# Patient Record
Sex: FEMALE | Race: WHITE | Employment: STUDENT | URBAN - METROPOLITAN AREA
[De-identification: names, ages, dates, MRNs, and addresses within clinical notes are randomized per-mention and may not be internally consistent; named-entity substitution may affect disease eponyms.]

---

## 2018-09-08 PROBLEM — O00.209 ECTOPIC PREGNANCY OF OVARY: Status: ACTIVE | Noted: 2018-09-08

## 2020-07-20 ENCOUNTER — HOSPITAL ENCOUNTER (OUTPATIENT)
Dept: PHYSICAL THERAPY | Age: 35
Discharge: HOME | End: 2020-07-20
Payer: MEDICAID

## 2020-07-20 PROCEDURE — 97163 PT EVAL HIGH COMPLEX 45 MIN: CPT

## 2020-07-20 NOTE — PROGRESS NOTES
PHYSICAL THERAPY - DAILY TREATMENT NOTE    Patient Name: Elissa Lyons        Date: 2020  : 1985   YES Patient  Verified  Visit #:   1   of   8  Insurance: Payor: Magda Patel / Plan: [address] [place] [address] / Product Type: Managed Care Medicaid /      In time:  Out time: 130   Total Treatment Time: 45     Medicare Time Tracking (below)   Total Timed Codes (min):    1:1 Treatment Time:       TREATMENT AREA =  Temporomandibular joint disorder (TMJ) [M26.609]    SUBJECTIVE                                                         See IE            OBJECTIVE      5 min Self Care: See below   Rationale:    increase ROM and increase strength to improve the patients ability to perform ADLs with less pain    Billed With/As:   [] TE   [] TA   [] Neuro   [x] Self Care Patient Education: [x] Review HEP    [] Progressed/Changed HEP based on:   [] positioning   [] body mechanics   [] transfers   [] heat/ice application    [] other:         thoroughout evaluation min Patient Education:  YES  Reviewed HEP   [x] A and P related to current DX [x] LTGs and POC     Other Objective/Functional Measures:                                   See IE       ASSESSMENT    [x]  See Initial Evaluation     PLAN    []  Upgrade activities as tolerated YES Continue plan of care   []  Discharge due to :    []  Other: Pt will return for follow up and to initiate POC     Therapist: Pattie Lomeli PT, [address]    Date: 2020 Time: 9:19 AM

## 2020-07-20 NOTE — PROGRESS NOTES
[facility] PHYSICAL THERAPY AT [facility] Nathaly Hester, [address] - Phone: [phone]  Fax: [phone] / [address] [facility]  Patient Name: Joyce Tobias : 1985   Medical   Diagnosis: Temporomandibular joint disorder (TMJ) [M26.609] Treatment Diagnosis: TMJ disorder, Neck pain   Onset Date:      Referral Source: Magen Nair, * Start of Care [facility]): 2020   Prior Hospitalization: See medical history Provider #: [id number]   Prior Level of Function:    Comorbidities: Neck pain, HA   Medications: Verified on Patient Summary List     The Plan of Care and following information is based on the information from the initial evaluation.   ===========================================================================================  Assessment / key information:   Joyce Tobias is a 29 y.o.  yo female with Dx of Temporomandibular joint disorder (TMJ) [M26.609]. She currently rates her pain as 7/10 at worst, 2/10 at best, primarily located at B cervical and TMJ region. She complains of difficulty and increased pain with sitting, studying, and sleeping. She notes TMJ pain is increased with stress. Notes daily HA in suboccipital region and intermittent N and T into B 1st-5th digits. Objective Findings:  Cervical ROM:  Ext with R deviation, Lat Flx; R = 20, L = 25, Rot: R = 50, L = 60. Limited subcranial motion with B OA, AA motion. Manual Muscle Testing: 3+/5 for MT, LT B. Posture: IR pattern B shoulder. Vertical opening 30 mm excursion R at 10 mm, L at 8 mm, with opening pattern to right. Hypermobile left. TTP in B masseters, SCOM, suboccipitals and sub and posterior mandibular region. Muscle length restrictions noted in B Pec minor, R > L. Directional preference extension with repeated motion testing. Compression/Distraction Test: + Spurlings Test: + .  Pt instructed in HEP and will f/u in clinic for PT, indicating signs and symptoms consistent with TMJ and cervical dysfunction.     ===========================================================================================  Eval Complexity: History MEDIUM  Complexity : 1-2 comorbidities / personal factors will impact the outcome/ POC ;  Examination  MEDIUM Complexity : 3 Standardized tests and measures addressing body structure, function, activity limitation and / or participation in recreation ; Presentation LOW Complexity : Stable, uncomplicated ;  Decision Making MEDIUM Complexity : FOTO score of 26-74; Overall Complexity LOW   Problem List: decrease ROM, decrease strength, decrease ADL/ functional abilitiies, decrease activity tolerance and decrease flexibility/ joint mobility   Treatment Plan may include any combination of the following: Therapeutic exercise, Therapeutic activities, Neuromuscular re-education, Manual therapy, Patient education, Self Care training and Functional mobility training  Patient / Family readiness to learn indicated by: asking questions, trying to perform skills and interest  Persons(s) to be included in education: patient (P)  Barriers to Learning/Limitations: None  Measures taken, if barriers to learning:    Patient Goal (s): To have less pain, N and T     Rehabilitation Potential: good   Short Term Goals: To be accomplished in 2 weeks:  1. Establish HEP for strength, flexibility and joint distraction. 2. Pt will demonstrate full AROM of TMJ region to improve pain level for ADLs. 3. Patient to report reduction of daily symptoms < 5/10 to improve function for ADLs, IADLs.  Long Term Goals: To be accomplished in  4 weeks:  1. The patient will be independent in HEP for long-term management of symptoms. 2. Improve functional ability as evidenced by a score of > or = 59 on FOTO. 3. Patient will demonstrate functional and nonpainful cervical rotation and sidebending to improve ability to perform ADLs, IADLs.   4. Pt will report centralization of symptoms x 2 weeks to improve ability to perform ADLs, IADLs with less pain. Frequency / Duration:   Patient to be seen 2 times per week for 4  weeks:  Patient / Caregiver education and instruction: self care and exercises    Therapist Signature: Kaelyn Bolanos PT Date: 0/05/9407   Certification Period: n/a Time: 9:19 AM   ===========================================================================================  I certify that the above Physical Therapy Services are being furnished while the patient is under my care. I agree with the treatment plan and certify that this therapy is necessary. To ensure your patient receives the highest quality care and to avoid disruption in therapy please sign and return this plan of care within 21 days. Per Medicaid guidelines if the plan of care is not received within 21 days the patient's care must be put on hold until signed. Physician Signature:        Date:       Time:     Please sign and return to [facility] or you may fax the signed copy to [phone]. Thank you.

## 2020-07-22 ENCOUNTER — HOSPITAL ENCOUNTER (OUTPATIENT)
Dept: PHYSICAL THERAPY | Age: 35
Discharge: HOME | End: 2020-07-22
Payer: MEDICAID

## 2020-07-22 PROCEDURE — 97535 SELF CARE MNGMENT TRAINING: CPT

## 2020-07-22 PROCEDURE — 97112 NEUROMUSCULAR REEDUCATION: CPT

## 2020-07-22 PROCEDURE — 97110 THERAPEUTIC EXERCISES: CPT

## 2020-07-22 NOTE — PROGRESS NOTES
PHYSICAL THERAPY - DAILY TREATMENT NOTE    Patient Name: Rema Calvo        Date: 2020  : 1985    Patient  Verified: YES  Visit #:   2   of   12  Insurance: Payor: BLUE CROSS MEDICAID / Plan: [facility] Markus Castor / Product Type: Managed Care Medicaid /      In time: 11:45 Out time: 12:40   Total Treatment Time: 55     Medicare Time Tracking (below)   Total Timed Codes (min):  - 1:1 Treatment Time:  -     TREATMENT AREA/ DIAGNOSIS = Temporomandibular joint disorder (TMJ) [M26.609]    SUBJECTIVE  Pain Level (on 0 to 10 scale):  4  / 10   Medication Changes/New allergies or changes in medical history, any new surgeries or procedures? NO    If yes, update Summary List   Subjective Functional Status/Changes:  []  No changes reported     Pt reports numbness and tingling in the hands whn reaching overhead.        OBJECTIVE  Modalities Rationale:     decrease inflammation and decrease pain to improve patient's ability to perform ADLs without pain     min [] Estim, type/location:                                      []  att     []  unatt     []  w/US     []  w/ice    []  w/heat    min []  Mechanical Traction: type/lbs                   []  pro   []  sup   []  int   []  cont    []  before manual    []  after manual    min []  Ultrasound, settings/location:      min []  Iontophoresis w/ dexamethasone, location:                                               []  take home patch       []  in clinic   10 min []  Ice     [x]  Heat    location/position: c/s    min []  Vasopneumatic Device, press/temp:     min []  Other:    [] Skin assessment post-treatment (if applicable):    []  intact    []  redness- no adverse reaction     []redness  adverse reaction:        20 min Therapeutic Exercise:  [x]  See flow sheet   Rationale:      increase strength and improve coordination to improve the patients ability to perform ADLs without pain       5 min Manual Therapy: Trigger point release to scalenes Rationale:      increase ROM and increase tissue extensibility to improve patient's ability to perform ADLs without pain    10 min Neuromuscular Re-ed: [x]  See flow sheet   Rationale:    increase proprioception and , increase postural awareness to improve the patients ability to perform ADLs without pain       min Gait Training:  ___ feet with ___ device on level surfaces with ___ level of assistance   Rationale: To improve ambulation safety and efficiency in order to improve patient's ability to safely ambulate at home for self care. 15 min Self Care: Education on pathology   Rationale:    increase strength and improve coordination to improve the patients ability to perform ADLs without pain      Billed With/As:   [x] TE   [] TA   [] Neuro   [] Self Care Patient Education: [x] Review HEP    [] Progressed/Changed HEP based on:   [] positioning   [] body mechanics   [] transfers   [] heat/ice application    [] other:        Other Objective/Functional Measures:    + spurling  + cyriax release test  + Median nerve tension test  TTP to scalenes, upper trap  Decreased mobility of first rib     Post Treatment Pain Level (on 0 to 10) scale:   4  / 10     ASSESSMENT  Assessment/Changes in Function:     Pt required significant cueing for proper mechanics with rows and extension to avoid increased upper trap and scalene activation     []  See Progress Note/Recertification   Patient will continue to benefit from skilled PT services to modify and progress therapeutic interventions, address functional mobility deficits, address ROM deficits and address strength deficits to attain remaining goals. Progress toward goals / Updated goals:    No progress toward goals.  Just initiated plan of care     PLAN  [x]  Upgrade activities as tolerated YES Continue plan of care   []  Discharge due to :    []  Other:      Therapist: Haven Arevalo DPT     Date: 7/22/2020 Time: 10:13 AM        Future Appointments   Date Time Provider Adonay Babin   7/22/2020 11:45 AM [facility]   7/30/2020  8:45 AM [facility]   8/3/2020 11:30 AM [facility]   8/6/2020  7:30 AM [facility]

## 2020-07-30 ENCOUNTER — HOSPITAL ENCOUNTER (OUTPATIENT)
Dept: PHYSICAL THERAPY | Age: 35
Discharge: HOME | End: 2020-07-30
Payer: MEDICAID

## 2020-07-30 PROCEDURE — 97110 THERAPEUTIC EXERCISES: CPT

## 2020-07-30 PROCEDURE — 97535 SELF CARE MNGMENT TRAINING: CPT

## 2020-07-30 PROCEDURE — 97112 NEUROMUSCULAR REEDUCATION: CPT

## 2020-08-03 ENCOUNTER — HOSPITAL ENCOUNTER (OUTPATIENT)
Dept: PHYSICAL THERAPY | Age: 35
Discharge: HOME | End: 2020-08-03
Payer: MEDICAID

## 2020-08-03 PROCEDURE — 97535 SELF CARE MNGMENT TRAINING: CPT

## 2020-08-03 PROCEDURE — 97530 THERAPEUTIC ACTIVITIES: CPT

## 2020-08-03 PROCEDURE — 97112 NEUROMUSCULAR REEDUCATION: CPT

## 2020-08-03 PROCEDURE — 97110 THERAPEUTIC EXERCISES: CPT

## 2020-08-03 NOTE — PROGRESS NOTES
PHYSICAL THERAPY - DAILY TREATMENT NOTE     Patient Name: Linda Caballero        Date: 8/3/2020  : 1985    Patient  Verified: YES  Visit #:      12  Insurance: Payor: KIKA Torres / [place] [employer] Jorge Mooney / Product Type: Managed Care Medicaid /      In time: 11:30 Out time: 12:40   Total Treatment Time: 70     Medicare Time Tracking (below)   Total Timed Codes (min):  - 1:1 Treatment Time:  -     TREATMENT AREA/ DIAGNOSIS = Temporomandibular joint disorder (TMJ) [M26.609]    SUBJECTIVE  Pain Level (on 0 to 10 scale):  3  / 10   Medication Changes/New allergies or changes in medical history, any new surgeries or procedures?     NO    If yes, update Summary List   Subjective Functional Status/Changes:  []  No changes reported     Pt reports still getting numbness and tingling with activities       OBJECTIVE  Modalities Rationale:     decrease pain to improve patient's ability to perform ADLs without pain     min [] Estim, type/location:                                      []  att     []  unatt     []  w/US     []  w/ice    []  w/heat    min []  Mechanical Traction: type/lbs                   []  pro   []  sup   []  int   []  cont    []  before manual    []  after manual    min []  Ultrasound, settings/location:      min []  Iontophoresis w/ dexamethasone, location:                                               []  take home patch       []  in clinic   10 min []  Ice     [x]  Heat    location/position: c/s    min []  Vasopneumatic Device, press/temp:     min []  Other:    [] Skin assessment post-treatment (if applicable):    []  intact    []  redness- no adverse reaction     []redness  adverse reaction:        20 min Therapeutic Exercise:  [x]  See flow sheet   Rationale:      increase strength and improve coordination to improve the patients ability to perform ADLs without pain       5 min Manual Therapy: Trigger point release to scalenes   Rationale:      increase ROM and increase tissue extensibility to improve patient's ability to perform ADLs without pain      10 min Therapeutic Activity: [x]  See flow sheet   Rationale:    functional activitie to improve the patients ability to perform ADLs without pain      15 min Neuromuscular Re-ed: [x]  See flow sheet   Rationale:    increase proprioception to improve the patients ability to perform ADLs without pain    10 min Self Care: Education of pathology and exercise   Rationale:    education to improve the patients ability to self manage symptoms     Billed With/As:   [x] TE   [] TA   [] Neuro   [] Self Care Patient Education: [x] Review HEP    [] Progressed/Changed HEP based on:   [] positioning   [] body mechanics   [] transfers   [] heat/ice application    [] other:        Other Objective/Functional Measures:    Impaired scapulohumeral rhythm. Post Treatment Pain Level (on 0 to 10) scale:   0  / 10     ASSESSMENT  Assessment/Changes in Function:     Pt requires cueing for proper mechanics with therapeutic exercise     []  See Progress Note/Recertification   Patient will continue to benefit from skilled PT services to modify and progress therapeutic interventions, address functional mobility deficits, address ROM deficits and address strength deficits to attain remaining goals.    Progress toward goals / Updated goals:    Good Progress to    [] STG    [x] LTG  1 as shown by improved strength      PLAN  [x]  Upgrade activities as tolerated YES Continue plan of care   []  Discharge due to :    []  Other:      Therapist: Elan Mcguire DPT     Date: 8/3/2020 Time: 10:36 AM        Future Appointments   Date Time Provider Adonay Babin   8/3/2020 11:30 AM Danita Olds [facility]   8/6/2020  7:30 AM Danita Olds [facility]

## 2020-08-06 ENCOUNTER — HOSPITAL ENCOUNTER (OUTPATIENT)
Dept: PHYSICAL THERAPY | Age: 35
Discharge: HOME | End: 2020-08-06
Payer: MEDICAID

## 2020-08-06 PROCEDURE — 97535 SELF CARE MNGMENT TRAINING: CPT

## 2020-08-06 PROCEDURE — 97110 THERAPEUTIC EXERCISES: CPT

## 2020-08-06 PROCEDURE — 97112 NEUROMUSCULAR REEDUCATION: CPT

## 2020-08-06 NOTE — PROGRESS NOTES
PHYSICAL THERAPY - DAILY TREATMENT NOTE    Patient Name: Colt Kelsey        Date: 2020  : 1985    Patient  Verified: YES  Visit #:      of   12  Insurance: Payor: BLUE CROSS MEDICAID / Plan: [facility] / Product Type: Managed Care Medicaid /      In time: 7:30 Out time: 8:30   Total Treatment Time: 60     Medicare Time Tracking (below)   Total Timed Codes (min):  - 1:1 Treatment Time:  -     TREATMENT AREA/ DIAGNOSIS = Temporomandibular joint disorder (TMJ) [M26.609]    SUBJECTIVE  Pain Level (on 0 to 10 scale):  4  / 10   Medication Changes/New allergies or changes in medical history, any new surgeries or procedures?     NO    If yes, update Summary List   Subjective Functional Status/Changes:  []  No changes reported     Pt reports that she is still getting numbness and tingling with ADLs, Pt reports getting cramping on L levator and R medial scapular border with activities      OBJECTIVE  Modalities Rationale:     decrease pain and increase tissue extensibility to improve patient's ability to perform ADLs without pain     min [] Estim, type/location:                                      []  att     []  unatt     []  w/US     []  w/ice    []  w/heat    min []  Mechanical Traction: type/lbs                   []  pro   []  sup   []  int   []  cont    []  before manual    []  after manual    min []  Ultrasound, settings/location:      min []  Iontophoresis w/ dexamethasone, location:                                               []  take home patch       []  in clinic   10 min []  Ice     [x]  Heat    location/position: c/s    min []  Vasopneumatic Device, press/temp:     min []  Other:    [] Skin assessment post-treatment (if applicable):    []  intact    []  redness- no adverse reaction     []redness  adverse reaction:        20 min Therapeutic Exercise:  [x]  See flow sheet   Rationale:      increase strength and improve coordination to improve the patients ability to perform ADLs without pain       5 min Manual Therapy: Trigger point to L levator    Rationale:      increase tissue extensibility to improve patient's ability to perform ADLs without pain    15 min Neuromuscular Re-ed: [x]  See flow sheet   Rationale:    improve coordination, improve balance and increase proprioception to improve the patients ability to perform ADLs without pain      10 min Self Care: Education on exercise and pathology. HEP education   Rationale:    education to improve the patients ability to perform ADLs without pain      Billed With/As:   [x] TE   [] TA   [] Neuro   [] Self Care Patient Education: [x] Review HEP    [] Progressed/Changed HEP based on:   [] positioning   [] body mechanics   [] transfers   [] heat/ice application    [] other:        Other Objective/Functional Measures:    Significant trigger points in levator. Frequent spasm and cramping of lower trap with exercise   Post Treatment Pain Level (on 0 to 10) scale:   0  / 10     ASSESSMENT  Assessment/Changes in Function:     Pt still has significant compensatory motions with rows and other scapular exercises     []  See Progress Note/Recertification   Patient will continue to benefit from skilled PT services to modify and progress therapeutic interventions, address functional mobility deficits, address ROM deficits, address strength deficits, analyze and address soft tissue restrictions and analyze and cue movement patterns to attain remaining goals. Progress toward goals / Updated goals:    Fair Progress to    [] STG    [] LTG  1 as shown by still having numbness and tingling with ADLs     PLAN  []  Upgrade activities as tolerated YES Continue plan of care   []  Discharge due to :    []  Other:      Therapist: Heron Samayoa DPT     Date: 8/6/2020 Time: 7:57 AM        No future appointments.

## 2020-08-10 ENCOUNTER — HOSPITAL ENCOUNTER (OUTPATIENT)
Dept: PHYSICAL THERAPY | Age: 35
Discharge: HOME | End: 2020-08-10
Payer: MEDICAID

## 2020-08-10 PROCEDURE — 97530 THERAPEUTIC ACTIVITIES: CPT

## 2020-08-10 PROCEDURE — 97110 THERAPEUTIC EXERCISES: CPT

## 2020-08-10 PROCEDURE — 97112 NEUROMUSCULAR REEDUCATION: CPT

## 2020-08-10 PROCEDURE — 97535 SELF CARE MNGMENT TRAINING: CPT

## 2020-08-10 NOTE — PROGRESS NOTES
----- Message from Mansi Esparza sent at 6/20/2019  8:27 AM CDT -----  Contact: pt  .Type:  Patient Returning Call    Who Called: pt  Who Left Message for Patient:   Does the patient know what this is regarding?: reschedule appt   Would the patient rather a call back or a response via [facility]?  Call back   Best Call Back Number: [phone] (home)   Additional Information:       PHYSICAL THERAPY - DAILY TREATMENT NOTE     Patient Name: Nidhi Rene        Date: 8/10/2020  : 1985   YES Patient  Verified  Visit #:   6  of   10  Insurance: Payor: BLUE CROSS MEDICAID / Plan: [facility] [employer] / Product Type: Managed Care Medicaid /      In time: 3 Out time: 355   Total Treatment Time: 55     Medicare/BCBS Time Tracking (below)   Total Timed Codes (min): 5  1:1 Treatment Time:       TREATMENT AREA =  Temporomandibular joint disorder (TMJ) [M26.609]    SUBJECTIVE    Pain Level (on 0 to 10 scale):  4  / 10   Medication Changes/New allergies or changes in medical history, any new surgeries or procedures? NO    If yes, update Summary List   Subjective Functional Status/Changes:  []  No changes reported       Functional improvements: Modified her desk and home studying postures to reduce N and T and it is helping. Less pain in TMJ region soreness only. Functional impairments: More N and T in pm hours, increased with cervical flexEROM, postural FHP. OBJECTIVE    10 min Self Care: Pt ed on KT for activation of LT, MT   Rationale:    increase proprioception to improve the patients ability to perform UE elevation with less symptoms. 15 min Therapeutic Exercise:  [x]  See flow sheet   Rationale:      increase ROM and increase strength to improve the patients ability to perform ADLs with less pain     15 min Therapeutic Activity: [x]  See flow sheet   Rationale:      improve body mechanics to improve the patients ability to study with less pain. 10 min Neuromuscular Re-ed: [x]  See flow sheet   Rationale:      improve coordination, improve balance and increase proprioception to improve the patients ability to perform improved posture with studying, sitting.      5 min Manual Therapy: Technique:      [x] S/DTM []IASTM []PROM [] Passive Stretching   []manual TPR    []Jt manipulation:Gr I [] II []  III [] IV[] V[]  Treatment Area:  Gentle suboccipital releas Rationale:      decrease pain and increase tissue extensibility to improve patient's ability to perform studying with less pain. Billed With/As:   [x] TE   [] TA   [] Neuro   [] Self Care Patient Education: [x] Review HEP    [] Progressed/Changed HEP based on:   [] positioning   [] body mechanics   [] transfers   [] heat/ice application    [] other:        Other Objective/Functional Measures:    Emphasis on lower trapezius recruitment and KT for scapular positioning. Post Treatment Pain Level (on 0 to 10) scale:   2  / 10     ASSESSMENT    Assessment/Changes in Function:     Pt with modifications to rows, ext with TB with KT. Pt demonstrated good understanding of 2 strip application \"I\" strips B and issued for HEP. Attempted inhibition of UT, levator scap L however, pt with increased symptoms with this technique and removed tape. []  See Progress Note/Recertification   Patient will continue to benefit from skilled PT services to modify and progress therapeutic interventions, address functional mobility deficits, address ROM deficits, address strength deficits, analyze and address soft tissue restrictions and analyze and cue movement patterns to attain remaining goals. Progress toward goals / Updated goals:    Pt with good response to KT with reduced UT compensation during UE elevation and improved ability to recruit LT.      PLAN    [x]  Upgrade activities as tolerated YES Continue plan of care   []  Discharge due to :    []  Other:      Therapist: Yahir Valentin PT    Date: 8/10/2020 Time: 2:51 PM     Future Appointments   Date Time Provider Adonay Babin   8/10/2020  3:00 PM Stephy Patiño PT [facility]

## 2020-08-20 ENCOUNTER — HOSPITAL ENCOUNTER (OUTPATIENT)
Dept: PHYSICAL THERAPY | Age: 35
Discharge: HOME | End: 2020-08-20
Payer: MEDICAID

## 2020-08-20 PROCEDURE — 97110 THERAPEUTIC EXERCISES: CPT

## 2020-08-20 PROCEDURE — 97112 NEUROMUSCULAR REEDUCATION: CPT

## 2020-08-20 PROCEDURE — 97535 SELF CARE MNGMENT TRAINING: CPT

## 2020-08-20 NOTE — PROGRESS NOTES
PHYSICAL THERAPY - DAILY TREATMENT NOTE    Patient Name: Hao Perez        Date: 2020  : 1985    Patient  Verified: YES  Visit #:   7   of   8  Insurance: Payor: KIKA Correa / Plan: [place] Nasima Oppenheim / Product Type: Managed Care Medicaid /      In time: 9:00 Out time: 9:55   Total Treatment Time: 54     Medicare Time Tracking (below)   Total Timed Codes (min):  - 1:1 Treatment Time:  -     TREATMENT AREA/ DIAGNOSIS = Temporomandibular joint disorder (TMJ) [M26.609]    SUBJECTIVE  Pain Level (on 0 to 10 scale):  4  / 10   Medication Changes/New allergies or changes in medical history, any new surgeries or procedures? NO    If yes, update Summary List   Subjective Functional Status/Changes:  []  No changes reported     Pt reports that the taping has been a little inconsistent but it felt great the first day I had it on. OBJECTIVE  Modalities Rationale:     increase tissue extensibility to improve patient's ability to perform ADLs without pain     min [] Estim, type/location:                                      []  att     []  unatt     []  w/US     []  w/ice    []  w/heat    min []  Mechanical Traction: type/lbs                   []  pro   []  sup   []  int   []  cont    []  before manual    []  after manual    min []  Ultrasound, settings/location:      min []  Iontophoresis w/ dexamethasone, location:                                               []  take home patch       []  in clinic   10 min []  Ice     [x]  Heat    location/position: C/s and t/s    min []  Vasopneumatic Device, press/temp:     min []  Other:    [] Skin assessment post-treatment (if applicable):    []  intact    []  redness- no adverse reaction     []redness  adverse reaction:        15 min Therapeutic Exercise:  [x]  See flow sheet   Rationale:      increase strength and improve coordination to improve the patients ability to perform ADLs without pain       5 min Manual Therapy: SOR.  STM to c/s paraspinals   Rationale:      increase tissue extensibility to improve patient's ability to perform ADLs without pain      15 min Neuromuscular Re-ed: [x]  See flow sheet   Rationale:    increase proprioception to improve the patients ability to perform ADLs without pain      10 min Self Care: Ed on KT reapplication   Rationale:    educatio to improve the patients ability to self manage    Billed With/As:   [] TE   [] TA   [] Neuro   [] Self Care Patient Education: [x] Review HEP    [] Progressed/Changed HEP based on:   [] positioning   [] body mechanics   [] transfers   [] heat/ice application    [] other:        Other Objective/Functional Measures:    Decreased thoracic extension with rows and extensions. Still showing slight winging with rows   Post Treatment Pain Level (on 0 to 10) scale:   2  / 10     ASSESSMENT  Assessment/Changes in Function:     Pt having continued spasms and cramps in periscapular musculature with exercises      []  See Progress Note/Recertification   Patient will continue to benefit from skilled PT services to modify and progress therapeutic interventions, address functional mobility deficits, address ROM deficits, address strength deficits, analyze and address soft tissue restrictions and analyze and cue movement patterns to attain remaining goals.    Progress toward goals / Updated goals:    Fair Progress to    [] STG    [x] LTG  1 as shown by improved scapulohumeral rhythm needed for ADLs     PLAN  [x]  Upgrade activities as tolerated YES Continue plan of care   []  Discharge due to :    []  Other:      Therapist: Daryle Peach ,DPT     Date: 8/20/2020 Time: 8:06 AM        Future Appointments   Date Time Provider Adonay Babin   8/25/2020  2:45 PM Cecillia Durand [facility]   9/1/2020  1:30 PM Conchetta Alpha [facility]   9/9/2020  3:00 PM Cecillia Durand [facility]

## 2020-08-25 ENCOUNTER — HOSPITAL ENCOUNTER (OUTPATIENT)
Dept: PHYSICAL THERAPY | Age: 35
Discharge: HOME | End: 2020-08-25
Payer: MEDICAID

## 2020-08-25 PROCEDURE — 97112 NEUROMUSCULAR REEDUCATION: CPT

## 2020-08-25 PROCEDURE — 97530 THERAPEUTIC ACTIVITIES: CPT

## 2020-08-25 PROCEDURE — 97110 THERAPEUTIC EXERCISES: CPT

## 2020-08-25 PROCEDURE — 97535 SELF CARE MNGMENT TRAINING: CPT

## 2020-08-25 NOTE — PROGRESS NOTES
PHYSICAL THERAPY - DAILY TREATMENT NOTE    Patient Name: Dereck Lynne        Date: 2020  : 1985    Patient  Verified: YES  Visit #:   8      12  Insurance: Payor: BLUE CROSS MEDICAID / Plan: [facility] Marlene Donnellyar / Product Type: Managed Care Medicaid /      In time: 2:55 Out time: 3:55   Total Treatment Time: 60     Medicare Time Tracking (below)   Total Timed Codes (min):  - 1:1 Treatment Time:  -     TREATMENT AREA/ DIAGNOSIS = Temporomandibular joint disorder (TMJ) [M26.609]    SUBJECTIVE  Pain Level (on 0 to 10 scale):  2-3  / 10   Medication Changes/New allergies or changes in medical history, any new surgeries or procedures?     NO    If yes, update Summary List   Subjective Functional Status/Changes:  []  No changes reported     See PN      OBJECTIVE  Modalities Rationale:     decrease pain and increase tissue extensibility to improve patient's ability to perform ADLs without pain     min [] Estim, type/location:                                      []  att     []  unatt     []  w/US     []  w/ice    []  w/heat    min []  Mechanical Traction: type/lbs                   []  pro   []  sup   []  int   []  cont    []  before manual    []  after manual    min []  Ultrasound, settings/location:      min []  Iontophoresis w/ dexamethasone, location:                                               []  take home patch       []  in clinic   10 min []  Ice     [x]  Heat    location/position: C/S    min []  Vasopneumatic Device, press/temp:     min []  Other:    [] Skin assessment post-treatment (if applicable):    []  intact    []  redness- no adverse reaction     []redness  adverse reaction:        15 min Therapeutic Exercise:  [x]  See flow sheet   Rationale:      increase strength and improve coordination to improve the patients ability to perform ADLs without pain       5 min Manual Therapy: Trigger point release to paraspinals   Rationale:      increase ROM and increase tissue extensibility to improve patient's ability to perform ADLs without pain      10 min Therapeutic Activity: [x]  See flow sheet   Rationale:    increase strength and improve coordination to improve the patients ability to perform ADLs without pain      10 min Neuromuscular Re-ed: [x]  See flow sheet   Rationale:    increase proprioception to improve the patients ability to perform ADLs without pain       min Gait Training:  ___ feet with ___ device on level surfaces with ___ level of assistance   Rationale: To improve ambulation safety and efficiency in order to improve patient's ability to safely ambulate at home for self care. 10 min Self Care: Education on scapular mechanics    Rationale:    increase strength and improve coordination to improve the patients ability to perform ADLs without pain      Billed With/As:   [x] TE   [] TA   [] Neuro   [] Self Care Patient Education: [x] Review HEP    [] Progressed/Changed HEP based on:   [] positioning   [] body mechanics   [] transfers   [] heat/ice application    [] other:        Other Objective/Functional Measures:    See PN   Post Treatment Pain Level (on 0 to 10) scale:   2  / 10     ASSESSMENT  Assessment/Changes in Function:     See PN     []  See Progress Note/Recertification   Patient will continue to benefit from skilled PT services to modify and progress therapeutic interventions, address functional mobility deficits, address ROM deficits, address strength deficits, analyze and address soft tissue restrictions and analyze and cue movement patterns to attain remaining goals.    Progress toward goals / Updated goals:    See PN     PLAN  [x]  Upgrade activities as tolerated YES Continue plan of care   []  Discharge due to :    []  Other:      Therapist: Adryan Karimi DPT     Date: 8/25/2020 Time: 10:04 AM        Future Appointments   Date Time Provider Adonay Babin   8/25/2020  2:45 PM Elliot Bella [facility]   9/1/2020  1:30 PM Elliot Bella [facility]   9/9/2020  3:00 PM George [facility]

## 2020-08-25 NOTE — PROGRESS NOTES
[facility] PHYSICAL THERAPY AT [facility] [address] [place], [address]  Phone: [phone]  Fax: [phone]  PROGRESS NOTE  Patient Name: Crystal Rubalcava : 1985   Treatment/Medical Diagnosis: Temporomandibular joint disorder (TMJ) [D42.687]   Referral Source: Melany Montero, *     Date of Initial Visit: 2020 Attended Visits: 8 Missed Visits: -     SUMMARY OF TREATMENT  PT consisted of manual therapy techniques, therapeutic exercises, and modalities to improve strength, improve mobility, decrease pain, and improve overall function. CURRENT STATUS  Pt has shown good improvement since onset of care. Pt reports she isnt having numbness in the face any more. Pt reports still having numbness after sleeping and laying down. Pt showing improved scapulohumeral rhythm. Pt still does have slight anteriorly rounded shoulders and forward head posture. Pt has constant cramping of scapular musculature after exercise    Goal/Measure of Progress Goal Met? 1. The patient will be independent in [facility] for long-term management of symptoms. Status at last Eval: Not independent Current Status: Still progressing progressing   2. Improve functional ability as evidenced by a score of > or = 59 on FOTO   Status at last Eval: 49 Current Status: 58 progressing   3. Patient will demonstrate functional and nonpainful cervical rotation and sidebending to improve ability to perform ADLs, IADLs   Status at last Eval: painful Current Status: dysfunctional Progressing   4. Pt will report centralization of symptoms x 2 weeks to improve ability to perform ADLs, IADLs with less pain   Status at last Eval: daily Current Status: Once a day Progressing     New Goals to be achieved in __4__  weeks:  1. Continue goals above   2.  -   3.  -   4.  -       RECOMMENDATIONS  Pt to continue PT 2x a week for 4 weeks to continue to improve overall function.    If you have any questions/comments please contact us directly at [phone]. Thank you for allowing us to assist in the care of your patient. Therapist Signature: Venkata Galarza Date: 8/25/2020     Time: 10:03 AM   NOTE TO PHYSICIAN:  PLEASE COMPLETE THE ORDERS BELOW AND FAX TO   [facility] at [email or web address]: [phone]. If you are unable to process this request in 24 hours please contact our office: [phone].    ___ I have read the above report and request that my patient continue as recommended.   ___ I have read the above report and request that my patient continue therapy with the following changes/special instructions:_________________________________________________________   ___ I have read the above report and request that my patient be discharged from therapy.      Physician Signature:        Date:       Time:

## 2020-09-01 ENCOUNTER — HOSPITAL ENCOUNTER (OUTPATIENT)
Dept: PHYSICAL THERAPY | Age: 35
Discharge: HOME | End: 2020-09-01
Payer: MEDICAID

## 2020-09-01 PROCEDURE — 97110 THERAPEUTIC EXERCISES: CPT

## 2020-09-01 PROCEDURE — 97112 NEUROMUSCULAR REEDUCATION: CPT

## 2020-09-01 PROCEDURE — 97535 SELF CARE MNGMENT TRAINING: CPT

## 2020-09-01 NOTE — PROGRESS NOTES
9PHYSICAL THERAPY - DAILY TREATMENT NOTE    Patient Name: Zoila Mcdaniel        Date: 2020  : 1985    Patient  Verified: YES  Visit #:     Insurance: Payor: BL[employer] CROSS MEDICAID / [place] Jocelyn Ace / Product Type: Managed Care Medicaid /      In time: 1:35 Out time: 2:35   Total Treatment Time: 60     Medicare Time Tracking (below)   Total Timed Codes (min):  - 1:1 Treatment Time:  -     TREATMENT AREA/ DIAGNOSIS = Temporomandibular joint disorder (TMJ) [M26.609]    SUBJECTIVE  Pain Level (on 0 to 10 scale):  2-3  / 10   Medication Changes/New allergies or changes in medical history, any new surgeries or procedures? NO    If yes, update Summary List   Subjective Functional Status/Changes:  []  No changes reported     Pt reports less facial numbness. Pt reports less UE numbness.  Pt reports still having rounded shouldder      OBJECTIVE  Modalities Rationale:     decrease inflammation and decrease pain to improve patient's ability to perform ADLs without pain     min [] Estim, type/location:                                      []  att     []  unatt     []  w/US     []  w/ice    []  w/heat    min []  Mechanical Traction: type/lbs                   []  pro   []  sup   []  int   []  cont    []  before manual    []  after manual    min []  Ultrasound, settings/location:      min []  Iontophoresis w/ dexamethasone, location:                                               []  take home patch       []  in clinic   10 min []  Ice     [x]  Heat    location/position: T/S and L/S    min []  Vasopneumatic Device, press/temp:     min []  Other:    [] Skin assessment post-treatment (if applicable):    []  intact    []  redness- no adverse reaction     []redness  adverse reaction:        15 min Therapeutic Exercise:  [x]  See flow sheet   Rationale:      increase strength and improve coordination to improve the patients ability to perform ADLs without pain       5 min Manual Therapy: STM to c/s musculature    Rationale:      increase ROM and increase tissue extensibility to improve patient's ability to perform ADLs without pain      15 min Neuromuscular Re-ed: [x]  See flow sheet   Rationale:    increase proprioception to improve the patients ability to perform ADLs without pain      15 min Self Care: Education on posture and HEP   Rationale:    education to improve the patients ability to self manage symptoms     Billed With/As:   [x] TE   [] TA   [] Neuro   [] Self Care Patient Education: [x] Review HEP    [] Progressed/Changed HEP based on:   [] positioning   [] body mechanics   [] transfers   [] heat/ice application    [] other:        Other Objective/Functional Measures:    Pt showing improved scapulohumeral rhythm    Post Treatment Pain Level (on 0 to 10) scale:   2-3  / 10     ASSESSMENT  Assessment/Changes in Function:     Pt had improved activity tolerance to this session     []  See Progress Note/Recertification   Patient will continue to benefit from skilled PT services to modify and progress therapeutic interventions, address functional mobility deficits, address ROM deficits, address strength deficits, analyze and address soft tissue restrictions, analyze and cue movement patterns and analyze and modify body mechanics/ergonomics to attain remaining goals.    Progress toward goals / Updated goals:    Good Progress to    [] STG    [x] LTG  1 as shown by overall symptom decrease with ADLs     PLAN  [x]  Upgrade activities as tolerated YES Continue plan of care   []  Discharge due to :    []  Other:      Therapist: Eufemia Segundo DPT     Date: 9/1/2020 Time: 1:38 PM        Future Appointments   Date Time Provider Adonay Babin   9/9/2020  3:00 PM Loann Ditto [facility]

## 2020-09-09 ENCOUNTER — HOSPITAL ENCOUNTER (OUTPATIENT)
Dept: PHYSICAL THERAPY | Age: 35
Discharge: HOME | End: 2020-09-09
Payer: MEDICAID

## 2020-09-09 PROCEDURE — 97112 NEUROMUSCULAR REEDUCATION: CPT

## 2020-09-09 PROCEDURE — 97110 THERAPEUTIC EXERCISES: CPT

## 2020-09-09 PROCEDURE — 97535 SELF CARE MNGMENT TRAINING: CPT

## 2020-09-09 NOTE — PROGRESS NOTES
PHYSICAL THERAPY - DAILY TREATMENT NOTE    Patient Name: Venkat Coronel        Date: 2020  : 1985    Patient  Verified: YES  Visit #:   10   of   10  Insurance: Payor: Angelica Mota / Plan: [facility] [employer] / Product Type: Managed Care Medicaid /      In time: 2:55 Out time: 3:45   Total Treatment Time: 50     Medicare Time Tracking (below)   Total Timed Codes (min):  - 1:1 Treatment Time:  -     TREATMENT AREA/ DIAGNOSIS = Temporomandibular joint disorder (TMJ) [M26.609]    SUBJECTIVE  Pain Level (on 0 to 10 scale):  0  / 10   Medication Changes/New allergies or changes in medical history, any new surgeries or procedures? NO    If yes, update Summary List   Subjective Functional Status/Changes:  []  No changes reported     Pt reports that she is doing better but still having some pain with ADLs.        OBJECTIVE  Modalities Rationale:     decrease inflammation and decrease pain to improve patient's ability to perform ADLs without pain     min [] Estim, type/location:                                      []  att     []  unatt     []  w/US     []  w/ice    []  w/heat    min []  Mechanical Traction: type/lbs                   []  pro   []  sup   []  int   []  cont    []  before manual    []  after manual    min []  Ultrasound, settings/location:      min []  Iontophoresis w/ dexamethasone, location:                                               []  take home patch       []  in clinic   10 min []  Ice     [x]  Heat    location/position: C/S    min []  Vasopneumatic Device, press/temp:     min []  Other:    [] Skin assessment post-treatment (if applicable):    []  intact    []  redness- no adverse reaction     []redness  adverse reaction:        10 min Therapeutic Exercise:  [x]  See flow sheet   Rationale:      increase strength and improve coordination to improve the patients ability to perform ADLs without pain       5 min Manual Therapy: STM to c/s   Rationale:      decrease pain to improve patient's ability to perform ADLs without pain    15 min Neuromuscular Re-ed: [x]  See flow sheet   Rationale:    improve balance and increase proprioception to improve the patients ability to perform ADLs without pain      15 min Self Care: Education on pathology and exercise progressions   Rationale:    education to improve the patients ability to self manage symptoms    Billed With/As:   [x] TE   [] TA   [] Neuro   [] Self Care Patient Education: [x] Review HEP    [] Progressed/Changed HEP based on:   [] positioning   [] body mechanics   [] transfers   [] heat/ice application    [] other:        Other Objective/Functional Measures:    TTP to c/s musculature   Improved scapulohumeral rhythm   Post Treatment Pain Level (on 0 to 10) scale:   0  / 10     ASSESSMENT  Assessment/Changes in Function:     Pt will continue to continue with scapular stabilization exercises      []  See Progress Note/Recertification   Patient will continue to benefit from skilled PT services to modify and progress therapeutic interventions, address functional mobility deficits, address ROM deficits, address strength deficits, analyze and address soft tissue restrictions and analyze and cue movement patterns to attain remaining goals. Progress toward goals / Updated goals:    Met FOTO goal     PLAN  [x]  Upgrade activities as tolerated YES Continue plan of care   []  Discharge due to :    []  Other:      Therapist: Pamela Severino DPT     Date: 9/9/2020 Time: 3:52 PM        No future appointments.

## 2020-09-28 NOTE — PROGRESS NOTES
[address] [facility] PHYSICAL THERAPY AT [facility] [address] [place], [address]  Phone: [phone]  Fax: [phone] SUMMARY  Patient Name: Soraida Orellana : 1985   Treatment/Medical Diagnosis: Temporomandibular joint disorder (TMJ) [U95.460]   Referral Source: Berenice Matute, *     Date of Initial Visit: 2020 Attended Visits: 10 Missed Visits: -     SUMMARY OF TREATMENT  PT consisted of manual therapy techniques, therapeutic exercises, and modalities to improve strength, improve mobility, decrease pain, and improve overall function. CURRENT STATUS  Pt has shown good improvement since onset of care. Pt reports she isnt having numbness in the face any more. Pt reports still having numbness after sleeping and laying down. Pt showing improved scapulohumeral rhythm. Pt still does have slight anteriorly rounded shoulders and forward head posture. Pt has constant cramping of scapular musculature after exercise. Pt is independent with exercise. Pt educated on kinesiotaping to help improve scapular positioning. Pt to self discharge and continue with scapular stabilization program independently. Goal/Measure of Progress Goal Met? 1. The patient will be independent in [facility] for long-term management of symptoms. Status at last Eval: Still progressing Current Status: independent yes   2. Improve functional ability as evidenced by a score of > or = 59 on FOTO   Status at last Eval: 58 Current Status: 78 yes   3. Patient will demonstrate functional and nonpainful cervical rotation and sidebending to improve ability to perform ADLs, IADLs   Status at last Eval: dysfunctional Current Status: Improved but still dysfunctional no   4.   Pt will report centralization of symptoms x 2 weeks to improve ability to perform ADLs, IADLs with less pain   Status at last Eval: Once a day Current Status: intermittent progressing     RECOMMENDATIONS  Discontinue therapy due to lack of appreciable progress towards goals. If you have any questions/comments please contact us directly at [phone]. Thank you for allowing us to assist in the care of your patient. Therapist Signature: Adryan Karimi Date: 9/28/2020     Time: 3:33 PM       NOTE TO PHYSICIAN:  Your patient's insurance requires this discharge note be signed and returned. PLEASE COMPLETE THE ORDERS BELOW AND RETURN TO:  [facility] PHYSICAL THERAPY    ___ I have read the above report and request that my patient be discharged from therapy.      Physician Signature:        Date:       Time: